# Patient Record
Sex: FEMALE | Race: WHITE | NOT HISPANIC OR LATINO | URBAN - METROPOLITAN AREA
[De-identification: names, ages, dates, MRNs, and addresses within clinical notes are randomized per-mention and may not be internally consistent; named-entity substitution may affect disease eponyms.]

---

## 2021-01-29 ENCOUNTER — APPOINTMENT (RX ONLY)
Dept: URBAN - METROPOLITAN AREA CLINIC 164 | Facility: CLINIC | Age: 51
Setting detail: DERMATOLOGY
End: 2021-01-29

## 2021-01-29 DIAGNOSIS — L40.0 PSORIASIS VULGARIS: ICD-10-CM | Status: INADEQUATELY CONTROLLED

## 2021-01-29 PROCEDURE — ? COUNSELING

## 2021-01-29 PROCEDURE — ? RECOMMENDATIONS

## 2021-01-29 PROCEDURE — 99203 OFFICE O/P NEW LOW 30 MIN: CPT

## 2021-01-29 PROCEDURE — ? PRESCRIPTION

## 2021-01-29 RX ORDER — CALCIPOTRIENE AND BETAMETHASONE DIPROPIONATE 50; .5 UG/G; MG/G
AEROSOL, FOAM TOPICAL
Qty: 1 | Refills: 2 | Status: ERX | COMMUNITY
Start: 2021-01-29

## 2021-01-29 RX ADMIN — CALCIPOTRIENE AND BETAMETHASONE DIPROPIONATE: 50; .5 AEROSOL, FOAM TOPICAL at 00:00

## 2021-01-29 NOTE — PROCEDURE: RECOMMENDATIONS
Render Risk Assessment In Note?: no
Detail Level: Zone
Recommendations (Free Text): Use sample of Enstilar Foam given at visit for flares.\\nDiscussed TSal shampoo or P&s scalp soln
Recommendation Preamble: The following recommendations were made during the visit:

## 2021-08-02 ENCOUNTER — APPOINTMENT (RX ONLY)
Dept: URBAN - METROPOLITAN AREA CLINIC 164 | Facility: CLINIC | Age: 51
Setting detail: DERMATOLOGY
End: 2021-08-02

## 2021-08-02 DIAGNOSIS — L40.0 PSORIASIS VULGARIS: ICD-10-CM

## 2021-08-02 PROCEDURE — ? PRESCRIPTION

## 2021-08-02 PROCEDURE — 99213 OFFICE O/P EST LOW 20 MIN: CPT

## 2021-08-02 PROCEDURE — ? COUNSELING

## 2021-08-02 RX ORDER — HALCINONIDE TOPICAL 1 MG/ML
SOLUTION TOPICAL
Qty: 1 | Refills: 3 | Status: ERX | COMMUNITY
Start: 2021-08-02

## 2021-08-02 RX ADMIN — HALCINONIDE TOPICAL: 1 SOLUTION TOPICAL at 00:00

## 2021-08-02 ASSESSMENT — LOCATION ZONE DERM: LOCATION ZONE: SCALP

## 2021-08-02 ASSESSMENT — LOCATION SIMPLE DESCRIPTION DERM: LOCATION SIMPLE: ANTERIOR SCALP

## 2021-08-02 ASSESSMENT — LOCATION DETAILED DESCRIPTION DERM: LOCATION DETAILED: MID-FRONTAL SCALP

## 2022-02-07 ENCOUNTER — APPOINTMENT (RX ONLY)
Dept: URBAN - METROPOLITAN AREA CLINIC 164 | Facility: CLINIC | Age: 52
Setting detail: DERMATOLOGY
End: 2022-02-07

## 2022-02-07 DIAGNOSIS — D18.0 HEMANGIOMA: ICD-10-CM

## 2022-02-07 DIAGNOSIS — L57.8 OTHER SKIN CHANGES DUE TO CHRONIC EXPOSURE TO NONIONIZING RADIATION: ICD-10-CM

## 2022-02-07 DIAGNOSIS — L40.0 PSORIASIS VULGARIS: ICD-10-CM

## 2022-02-07 DIAGNOSIS — I83.9 ASYMPTOMATIC VARICOSE VEINS OF LOWER EXTREMITIES: ICD-10-CM

## 2022-02-07 DIAGNOSIS — L81.4 OTHER MELANIN HYPERPIGMENTATION: ICD-10-CM

## 2022-02-07 DIAGNOSIS — D22 MELANOCYTIC NEVI: ICD-10-CM

## 2022-02-07 PROBLEM — I83.91 ASYMPTOMATIC VARICOSE VEINS OF RIGHT LOWER EXTREMITY: Status: ACTIVE | Noted: 2022-02-07

## 2022-02-07 PROBLEM — D22.5 MELANOCYTIC NEVI OF TRUNK: Status: ACTIVE | Noted: 2022-02-07

## 2022-02-07 PROBLEM — D18.01 HEMANGIOMA OF SKIN AND SUBCUTANEOUS TISSUE: Status: ACTIVE | Noted: 2022-02-07

## 2022-02-07 PROCEDURE — ? TREATMENT REGIMEN

## 2022-02-07 PROCEDURE — ? COUNSELING

## 2022-02-07 PROCEDURE — ? FULL BODY SKIN EXAM

## 2022-02-07 PROCEDURE — ? DEFER

## 2022-02-07 PROCEDURE — 99213 OFFICE O/P EST LOW 20 MIN: CPT

## 2022-02-07 PROCEDURE — ? ADDITIONAL NOTES

## 2022-02-07 PROCEDURE — ? PRESCRIPTION

## 2022-02-07 RX ORDER — HALCINONIDE TOPICAL 1 MG/ML
SOLUTION TOPICAL
Qty: 120 | Refills: 2 | Status: ERX

## 2022-02-07 ASSESSMENT — LOCATION ZONE DERM
LOCATION ZONE: SCALP
LOCATION ZONE: TRUNK
LOCATION ZONE: ARM
LOCATION ZONE: LEG
LOCATION ZONE: TRUNK
LOCATION ZONE: FACE

## 2022-02-07 ASSESSMENT — LOCATION DETAILED DESCRIPTION DERM
LOCATION DETAILED: RIGHT PROXIMAL PRETIBIAL REGION
LOCATION DETAILED: EPIGASTRIC SKIN
LOCATION DETAILED: LEFT MEDIAL SUPERIOR CHEST
LOCATION DETAILED: RIGHT ANTERIOR PROXIMAL UPPER ARM
LOCATION DETAILED: LEFT VENTRAL PROXIMAL FOREARM
LOCATION DETAILED: SUPERIOR THORACIC SPINE
LOCATION DETAILED: LEFT MEDIAL SUPERIOR CHEST
LOCATION DETAILED: RIGHT VENTRAL PROXIMAL FOREARM
LOCATION DETAILED: INFERIOR MID FOREHEAD
LOCATION DETAILED: MID-FRONTAL SCALP
LOCATION DETAILED: LEFT ANTERIOR PROXIMAL UPPER ARM
LOCATION DETAILED: LEFT INFERIOR MEDIAL MIDBACK
LOCATION DETAILED: LEFT INFERIOR CENTRAL MALAR CHEEK
LOCATION DETAILED: INFERIOR THORACIC SPINE
LOCATION DETAILED: RIGHT MEDIAL SUPERIOR CHEST

## 2022-02-07 ASSESSMENT — LOCATION SIMPLE DESCRIPTION DERM
LOCATION SIMPLE: UPPER BACK
LOCATION SIMPLE: CHEST
LOCATION SIMPLE: INFERIOR FOREHEAD
LOCATION SIMPLE: LEFT FOREARM
LOCATION SIMPLE: LEFT UPPER ARM
LOCATION SIMPLE: RIGHT FOREARM
LOCATION SIMPLE: ANTERIOR SCALP
LOCATION SIMPLE: ABDOMEN
LOCATION SIMPLE: LEFT CHEEK
LOCATION SIMPLE: CHEST
LOCATION SIMPLE: RIGHT UPPER ARM
LOCATION SIMPLE: RIGHT PRETIBIAL REGION
LOCATION SIMPLE: LEFT LOWER BACK

## 2022-02-07 NOTE — PROCEDURE: TREATMENT REGIMEN
Initiate Treatment: Recommended Elta MD Clear Sunscreen
Detail Level: Zone
Spontaneous, unlabored and symmetrical

## 2022-02-07 NOTE — PROCEDURE: DEFER
Detail Level: Detailed
Introduction Text (Please End With A Colon): The following procedure was deferred:
Instructions (Optional): Schedule with Veda pierre for sclerotherapy

## 2022-02-08 ENCOUNTER — APPOINTMENT (RX ONLY)
Dept: URBAN - METROPOLITAN AREA CLINIC 164 | Facility: CLINIC | Age: 52
Setting detail: DERMATOLOGY
End: 2022-02-08

## 2022-02-08 DIAGNOSIS — Z41.9 ENCOUNTER FOR PROCEDURE FOR PURPOSES OTHER THAN REMEDYING HEALTH STATE, UNSPECIFIED: ICD-10-CM

## 2022-02-08 PROCEDURE — ? SUNSCREEN RECOMMENDATIONS

## 2022-02-08 PROCEDURE — ? COSMETIC CONSULTATION: SCLEROTHERAPY

## 2022-02-08 PROCEDURE — ? ADDITIONAL NOTES

## 2022-02-08 PROCEDURE — ? COSMETIC CONSULTATION: FILLERS

## 2022-02-08 ASSESSMENT — LOCATION ZONE DERM
LOCATION ZONE: FACE
LOCATION ZONE: LIP
LOCATION ZONE: LEG

## 2022-02-08 ASSESSMENT — LOCATION DETAILED DESCRIPTION DERM
LOCATION DETAILED: RIGHT LATERAL PROXIMAL PRETIBIAL REGION
LOCATION DETAILED: LEFT INFERIOR VERMILION LIP
LOCATION DETAILED: RIGHT CENTRAL MALAR CHEEK

## 2022-02-08 ASSESSMENT — LOCATION SIMPLE DESCRIPTION DERM
LOCATION SIMPLE: LEFT LIP
LOCATION SIMPLE: RIGHT PRETIBIAL REGION
LOCATION SIMPLE: RIGHT CHEEK

## 2022-02-08 NOTE — PROCEDURE: ADDITIONAL NOTES
Detail Level: Detailed
Additional Notes: Patient c/o dark and crepe-like under eye skin: \\n- recommended considering cheek filler 1 syringe split or each cheek \\n- Skinceuticals AOX Eye Gel BID\\n- SkinCeuticals AGE Eye Complex QHS\\n- VI peels for entire face/undereyes \\n- Antihistamines daily for allergies \\n- Sun protection daily\\n\\nPerioral vertical lines:\\n- discussed filler 1 syringe vollure\\n- discussed trying botox 4 units \\n\\nSpider veins:\\n- discussed sclerotherapy, $200 first syringe $100 per cc after\\n- will require 2-3 treatments\\n- advised patient to bring compression socks to appt 18-30 mmhg
Render Risk Assessment In Note?: yes

## 2022-03-02 ENCOUNTER — APPOINTMENT (RX ONLY)
Dept: URBAN - METROPOLITAN AREA CLINIC 164 | Facility: CLINIC | Age: 52
Setting detail: DERMATOLOGY
End: 2022-03-02

## 2022-03-02 DIAGNOSIS — I83.9 ASYMPTOMATIC VARICOSE VEINS OF LOWER EXTREMITIES: ICD-10-CM

## 2022-03-02 PROBLEM — I83.91 ASYMPTOMATIC VARICOSE VEINS OF RIGHT LOWER EXTREMITY: Status: ACTIVE | Noted: 2022-03-02

## 2022-03-02 PROCEDURE — ? COUNSELING

## 2022-03-02 PROCEDURE — ? SCLEROTHERAPY (COSMETIC)

## 2022-03-02 PROCEDURE — ? TREATMENT REGIMEN

## 2022-03-02 ASSESSMENT — LOCATION DETAILED DESCRIPTION DERM
LOCATION DETAILED: RIGHT PROXIMAL CALF
LOCATION DETAILED: RIGHT PROXIMAL PRETIBIAL REGION

## 2022-03-02 ASSESSMENT — LOCATION ZONE DERM: LOCATION ZONE: LEG

## 2022-03-02 ASSESSMENT — LOCATION SIMPLE DESCRIPTION DERM
LOCATION SIMPLE: RIGHT CALF
LOCATION SIMPLE: RIGHT PRETIBIAL REGION

## 2022-03-02 NOTE — PROCEDURE: SCLEROTHERAPY (COSMETIC)
Post-Care Instructions: Compression for 1-3 weeks is critical to optimize your recovery and results. Compliance with compression helps to prevent blood clots and minimizes pain, swelling, bruising, skin discoloration (staining) and the recurrence of vessels. \\n\\nCompression and Wound care: Leg compression for 1-3 weeks is key to your success and safety. Compression at night is only needed the first day. After that, compression is needed only during wake hours.  However, if your leg feels better with compression at night, then you may continue compression at night as tolerated.       \\n\\nActivity: It is important NOT to be sitting or lying down for several hours after surgery. You may begin walking immediately after surgery. Continue walking and normal ADLs. For 2 days after treatment: Avoid aerobic exercise, weight training, and all other types of exertion that increase your breathing and pulse rates. Avoid strenuous cardio exercise involving the legs for 1 week.\\n\\nDo not get a tan for one month after sclerotherapy. Tanning increases your risk of skin discoloration.  \\n  \\nBathing: After 24 hours, you may shower or bathe in tepid water. Avoid hot water x1 week as this increases vascular flow. Avoid immersion in hot tubs or bathtub. \\n\\nDiscomfort: For pain or discomfort, you may take acetaminophen (Tylenol). Do not use aspirin, products containing aspirin, ibuprofen, or naproxen for five days after your surgery, unless approved by your physician. \\n\\n\\nPossible side effects following sclerotherapy: \\n- Mild swelling is expected\\n- The injection sites may also become bruised or gray. \\n- Darkening of the injected veins  \\n- Brownish staining of the skin  \\n- Small clotted vessels under the surface of the skin that you can feel   \\n- Bruising of the injection sites    \\n\\nWhen to contact your physician: \\n- Treated areas become increasingly sore, tender, red or warm  \\n- Acetaminophen does not relieve your discomfort \\n- Injection sites turn black or the skin around the site breaks down \\n- Ulceration of the injection sites  \\n- You develop significant swelling or pain in the leg\\n- Darkening of large areas of the skin or foot
Sclerosant (A) %: 11
Sclerosant Volume (Cc): 10
Disposition: Compression stockings were placed postoperatively.
Consent was obtained with risks, benefits, and alternatives discussed for the above procedures.  Photographs were taken.
Sclerosant Volume (Cc): 2.5
Price (Use Numbers Only, No Special Characters Or $): 300
Detail Level: Detailed

## 2022-04-13 ENCOUNTER — APPOINTMENT (RX ONLY)
Dept: URBAN - METROPOLITAN AREA CLINIC 164 | Facility: CLINIC | Age: 52
Setting detail: DERMATOLOGY
End: 2022-04-13

## 2022-04-13 DIAGNOSIS — Z41.9 ENCOUNTER FOR PROCEDURE FOR PURPOSES OTHER THAN REMEDYING HEALTH STATE, UNSPECIFIED: ICD-10-CM

## 2022-04-13 PROCEDURE — ? COSMETIC FOLLOW-UP

## 2022-04-13 PROCEDURE — ? ADDITIONAL NOTES

## 2022-04-13 NOTE — PROCEDURE: ADDITIONAL NOTES
Render Risk Assessment In Note?: yes
Detail Level: Detailed
Additional Notes: Recommended a second treatment to completely close the veins, second treatments are best performed 6-8 weeks after the first to prevent recurrence of flow through the vessels treated. Patient will call to schedule.

## 2022-04-13 NOTE — PROCEDURE: COSMETIC FOLLOW-UP
Global Improvement: Very Good
Patient Satisfaction: Very pleased
Detail Level: Zone
Treatment Override (Free Text): Sclerotherapy
Side Effects Or Complications: None

## 2023-02-07 ENCOUNTER — APPOINTMENT (RX ONLY)
Dept: URBAN - METROPOLITAN AREA CLINIC 164 | Facility: CLINIC | Age: 53
Setting detail: DERMATOLOGY
End: 2023-02-07

## 2023-02-07 DIAGNOSIS — L81.4 OTHER MELANIN HYPERPIGMENTATION: ICD-10-CM

## 2023-02-07 DIAGNOSIS — L82.0 INFLAMED SEBORRHEIC KERATOSIS: ICD-10-CM

## 2023-02-07 DIAGNOSIS — D18.0 HEMANGIOMA: ICD-10-CM

## 2023-02-07 DIAGNOSIS — L40.0 PSORIASIS VULGARIS: ICD-10-CM | Status: UNCHANGED

## 2023-02-07 DIAGNOSIS — L82.1 OTHER SEBORRHEIC KERATOSIS: ICD-10-CM | Status: STABLE

## 2023-02-07 DIAGNOSIS — D22 MELANOCYTIC NEVI: ICD-10-CM | Status: STABLE

## 2023-02-07 DIAGNOSIS — L57.8 OTHER SKIN CHANGES DUE TO CHRONIC EXPOSURE TO NONIONIZING RADIATION: ICD-10-CM

## 2023-02-07 PROBLEM — D18.01 HEMANGIOMA OF SKIN AND SUBCUTANEOUS TISSUE: Status: ACTIVE | Noted: 2023-02-07

## 2023-02-07 PROBLEM — D22.5 MELANOCYTIC NEVI OF TRUNK: Status: ACTIVE | Noted: 2023-02-07

## 2023-02-07 PROCEDURE — ? PRESCRIPTION MEDICATION MANAGEMENT

## 2023-02-07 PROCEDURE — 99214 OFFICE O/P EST MOD 30 MIN: CPT

## 2023-02-07 PROCEDURE — ? TREATMENT REGIMEN

## 2023-02-07 PROCEDURE — ? PRESCRIPTION SAMPLES PROVIDED

## 2023-02-07 PROCEDURE — ? COUNSELING

## 2023-02-07 PROCEDURE — ? FULL BODY SKIN EXAM

## 2023-02-07 PROCEDURE — ? ADDITIONAL NOTES

## 2023-02-07 ASSESSMENT — LOCATION ZONE DERM
LOCATION ZONE: SCALP
LOCATION ZONE: TRUNK
LOCATION ZONE: ARM
LOCATION ZONE: FACE

## 2023-02-07 ASSESSMENT — LOCATION SIMPLE DESCRIPTION DERM
LOCATION SIMPLE: ANTERIOR SCALP
LOCATION SIMPLE: RIGHT UPPER BACK
LOCATION SIMPLE: LEFT CHEEK
LOCATION SIMPLE: CHEST
LOCATION SIMPLE: UPPER BACK
LOCATION SIMPLE: LEFT UPPER ARM
LOCATION SIMPLE: INFERIOR FOREHEAD
LOCATION SIMPLE: RIGHT FOREARM
LOCATION SIMPLE: LEFT FOREARM
LOCATION SIMPLE: RIGHT UPPER ARM

## 2023-02-07 ASSESSMENT — LOCATION DETAILED DESCRIPTION DERM
LOCATION DETAILED: RIGHT VENTRAL PROXIMAL FOREARM
LOCATION DETAILED: LOWER STERNUM
LOCATION DETAILED: LEFT INFERIOR CENTRAL MALAR CHEEK
LOCATION DETAILED: LEFT LATERAL SUPERIOR CHEST
LOCATION DETAILED: LEFT VENTRAL PROXIMAL FOREARM
LOCATION DETAILED: MID-FRONTAL SCALP
LOCATION DETAILED: RIGHT SUPERIOR MEDIAL UPPER BACK
LOCATION DETAILED: INFERIOR THORACIC SPINE
LOCATION DETAILED: LEFT MEDIAL SUPERIOR CHEST
LOCATION DETAILED: RIGHT ANTERIOR PROXIMAL UPPER ARM
LOCATION DETAILED: INFERIOR MID FOREHEAD
LOCATION DETAILED: LEFT ANTERIOR PROXIMAL UPPER ARM
LOCATION DETAILED: RIGHT MEDIAL SUPERIOR CHEST
LOCATION DETAILED: SUPERIOR THORACIC SPINE

## 2023-02-07 NOTE — PROCEDURE: ADDITIONAL NOTES
Detail Level: Simple
Render Risk Assessment In Note?: no
Additional Notes: Patient consent was obtained to proceed with the visit and recommended plan of care after discussion of all risks and benefits, including the risk of COVID-19 exposure.
Additional Notes: Discussed systemic agents

## 2023-02-07 NOTE — HPI: EVALUATION OF SKIN LESION(S)
Hpi Title: Evaluation of Skin Lesions
How Severe Are Your Spot(S)?: mild
Have Your Spot(S) Been Treated In The Past?: has not been treated
Additional History: Psoriasis

## 2023-09-21 NOTE — PROCEDURE: FULL BODY SKIN EXAM
Pharmacy Request  Next OV 11/6/23, pt would need refill before appt. Last filled 6/20/23 for 90 day. Please advise, AM  
Price (Do Not Change): 0.00
Detail Level: Simple
Instructions: This plan will send the code FBSE to the PM system.  DO NOT or CHANGE the price.